# Patient Record
Sex: MALE | Race: OTHER | NOT HISPANIC OR LATINO | URBAN - METROPOLITAN AREA
[De-identification: names, ages, dates, MRNs, and addresses within clinical notes are randomized per-mention and may not be internally consistent; named-entity substitution may affect disease eponyms.]

---

## 2017-05-01 ENCOUNTER — EMERGENCY (EMERGENCY)
Facility: HOSPITAL | Age: 67
LOS: 1 days | Discharge: HOME | End: 2017-05-01
Attending: EMERGENCY MEDICINE | Admitting: EMERGENCY MEDICINE
Payer: MEDICARE

## 2017-05-01 VITALS
OXYGEN SATURATION: 97 % | HEART RATE: 89 BPM | SYSTOLIC BLOOD PRESSURE: 170 MMHG | RESPIRATION RATE: 16 BRPM | DIASTOLIC BLOOD PRESSURE: 91 MMHG | TEMPERATURE: 98 F

## 2017-05-01 DIAGNOSIS — M70.41 PREPATELLAR BURSITIS, RIGHT KNEE: ICD-10-CM

## 2017-05-01 DIAGNOSIS — M79.89 OTHER SPECIFIED SOFT TISSUE DISORDERS: ICD-10-CM

## 2017-05-01 DIAGNOSIS — Z79.899 OTHER LONG TERM (CURRENT) DRUG THERAPY: ICD-10-CM

## 2017-05-01 LAB
B PERT IGG+IGM PNL SER: ABNORMAL
COLOR FLD: SIGNIFICANT CHANGE UP
EOSINOPHIL # FLD: 20 % — SIGNIFICANT CHANGE UP
FLUID INTAKE SUBSTANCE CLASS: SIGNIFICANT CHANGE UP
FLUID SEGMENTED GRANULOCYTES: 50 % — SIGNIFICANT CHANGE UP
GLUCOSE FLD-MCNC: 111 MG/DL — SIGNIFICANT CHANGE UP
LYMPHOCYTES # FLD: 20 % — SIGNIFICANT CHANGE UP
MONOS+MACROS # FLD: 10 % — SIGNIFICANT CHANGE UP
PROT FLD-MCNC: 5 G/DL — SIGNIFICANT CHANGE UP
RCV VOL RI: HIGH /UL (ref 0–5)
TOTAL NUCLEATED CELL COUNT, BODY FLUID: 111 /UL — HIGH (ref 0–5)
TUBE TYPE: SIGNIFICANT CHANGE UP

## 2017-05-01 PROCEDURE — 76942 ECHO GUIDE FOR BIOPSY: CPT

## 2017-05-01 PROCEDURE — 99284 EMERGENCY DEPT VISIT MOD MDM: CPT | Mod: 25,GC

## 2017-05-01 PROCEDURE — 87070 CULTURE OTHR SPECIMN AEROBIC: CPT

## 2017-05-01 PROCEDURE — 20610 DRAIN/INJ JOINT/BURSA W/O US: CPT

## 2017-05-01 PROCEDURE — 87205 SMEAR GRAM STAIN: CPT

## 2017-05-01 PROCEDURE — 73562 X-RAY EXAM OF KNEE 3: CPT

## 2017-05-01 PROCEDURE — 87075 CULTR BACTERIA EXCEPT BLOOD: CPT

## 2017-05-01 PROCEDURE — 99284 EMERGENCY DEPT VISIT MOD MDM: CPT | Mod: 25

## 2017-05-01 PROCEDURE — 89051 BODY FLUID CELL COUNT: CPT

## 2017-05-01 PROCEDURE — 82945 GLUCOSE OTHER FLUID: CPT

## 2017-05-01 PROCEDURE — 89060 EXAM SYNOVIAL FLUID CRYSTALS: CPT

## 2017-05-01 PROCEDURE — 84157 ASSAY OF PROTEIN OTHER: CPT

## 2017-05-01 PROCEDURE — 20610 DRAIN/INJ JOINT/BURSA W/O US: CPT | Mod: RT

## 2017-05-01 PROCEDURE — 73562 X-RAY EXAM OF KNEE 3: CPT | Mod: 26,RT

## 2017-05-01 RX ORDER — ACETAMINOPHEN 500 MG
975 TABLET ORAL ONCE
Qty: 0 | Refills: 0 | Status: COMPLETED | OUTPATIENT
Start: 2017-05-01 | End: 2017-05-01

## 2017-05-01 RX ADMIN — Medication 975 MILLIGRAM(S): at 20:04

## 2017-05-01 NOTE — ED PROVIDER NOTE - OBJECTIVE STATEMENT
66M no pertinent past medical history presents with R knee swelling x approx 10 days.  States he may have had minor blunt trauma to knee last week but does remember specific traumatic event.  No pain.  Denies fever/chills, redness, increased warmth, recent travel, calf swelling.  Able to ambulate without difficult.  On no antiplatelets/anticoagulants.

## 2017-05-01 NOTE — ED PROVIDER NOTE - ATTENDING CONTRIBUTION TO CARE
patient with swelling above patella 10 days post injury of hitting front of knee. full range of motion of knee. no pain with axial loading. no redness. no systemic symptom.   likely prepatellar bursitis, non septic. plan for drainage and ortho f/u.

## 2017-05-01 NOTE — ED ADULT NURSE NOTE - OBJECTIVE STATEMENT
66 yr old male coming in with right knee swelling; denies pain. Pt able to ambulate; full ROM of knee. Pt states "I must have bumped my knee somewhere, I don't remember". Pt noticed swelling became worse and worse; no abrasions or lacerations noted. No redness. Swelling feels soft, non-tender. No fevers/chills.

## 2017-05-01 NOTE — ED PROVIDER NOTE - MEDICAL DECISION MAKING DETAILS
66M presents with R knee swelling.  well-appearing, VSS.  No clinical signs of symptoms of infectious etiology including septic arthritis.  likely prepatellar bursitis

## 2017-05-01 NOTE — ED PROVIDER NOTE - MUSCULOSKELETAL, MLM
Spine appears normal, range of motion is not limited, no muscle or joint tenderness.  swelling anterior to patella with no ttp.

## 2017-05-02 LAB
GRAM STN FLD: SIGNIFICANT CHANGE UP
SPECIMEN SOURCE: SIGNIFICANT CHANGE UP

## 2017-05-03 PROCEDURE — 76942 ECHO GUIDE FOR BIOPSY: CPT | Mod: 26

## 2017-05-06 LAB
CULTURE RESULTS: NO GROWTH — SIGNIFICANT CHANGE UP
SPECIMEN SOURCE: SIGNIFICANT CHANGE UP
